# Patient Record
Sex: MALE | Race: WHITE | Employment: OTHER | URBAN - NONMETROPOLITAN AREA
[De-identification: names, ages, dates, MRNs, and addresses within clinical notes are randomized per-mention and may not be internally consistent; named-entity substitution may affect disease eponyms.]

---

## 2022-02-02 ENCOUNTER — OFFICE VISIT (OUTPATIENT)
Dept: PRIMARY CARE CLINIC | Age: 62
End: 2022-02-02
Payer: COMMERCIAL

## 2022-02-02 VITALS
BODY MASS INDEX: 37.06 KG/M2 | HEIGHT: 66 IN | OXYGEN SATURATION: 99 % | WEIGHT: 230.6 LBS | DIASTOLIC BLOOD PRESSURE: 74 MMHG | HEART RATE: 97 BPM | TEMPERATURE: 97.3 F | SYSTOLIC BLOOD PRESSURE: 134 MMHG

## 2022-02-02 DIAGNOSIS — K61.0 ANAL CELLULITIS: Primary | ICD-10-CM

## 2022-02-02 PROCEDURE — G8484 FLU IMMUNIZE NO ADMIN: HCPCS | Performed by: FAMILY MEDICINE

## 2022-02-02 PROCEDURE — 99212 OFFICE O/P EST SF 10 MIN: CPT | Performed by: FAMILY MEDICINE

## 2022-02-02 PROCEDURE — 3017F COLORECTAL CA SCREEN DOC REV: CPT | Performed by: FAMILY MEDICINE

## 2022-02-02 PROCEDURE — 1036F TOBACCO NON-USER: CPT | Performed by: FAMILY MEDICINE

## 2022-02-02 PROCEDURE — G8427 DOCREV CUR MEDS BY ELIG CLIN: HCPCS | Performed by: FAMILY MEDICINE

## 2022-02-02 PROCEDURE — G8417 CALC BMI ABV UP PARAM F/U: HCPCS | Performed by: FAMILY MEDICINE

## 2022-02-02 PROCEDURE — 99213 OFFICE O/P EST LOW 20 MIN: CPT | Performed by: FAMILY MEDICINE

## 2022-02-02 RX ORDER — LYSINE 500 MG
500 TABLET ORAL DAILY
COMMUNITY

## 2022-02-02 RX ORDER — NAPROXEN 500 MG/1
1 TABLET ORAL DAILY
COMMUNITY
Start: 2021-09-02

## 2022-02-02 RX ORDER — FLUTICASONE PROPIONATE 50 MCG
2 SPRAY, SUSPENSION (ML) NASAL 2 TIMES DAILY
COMMUNITY
Start: 2021-02-10

## 2022-02-02 RX ORDER — SULFAMETHOXAZOLE AND TRIMETHOPRIM 800; 160 MG/1; MG/1
1 TABLET ORAL 2 TIMES DAILY
Qty: 20 TABLET | Refills: 0 | Status: SHIPPED | OUTPATIENT
Start: 2022-02-02 | End: 2022-02-12

## 2022-02-02 RX ORDER — LORATADINE 10 MG/1
10 TABLET ORAL
COMMUNITY

## 2022-02-02 RX ORDER — ZINC SULFATE 50(220)MG
1 CAPSULE ORAL DAILY
COMMUNITY

## 2022-02-02 RX ORDER — ASPIRIN 81 MG/1
81 TABLET ORAL DAILY
COMMUNITY

## 2022-02-02 RX ORDER — EZETIMIBE 10 MG/1
10 TABLET ORAL DAILY
COMMUNITY
Start: 2021-06-16

## 2022-02-02 RX ORDER — LISINOPRIL AND HYDROCHLOROTHIAZIDE 12.5; 1 MG/1; MG/1
1 TABLET ORAL DAILY
COMMUNITY
Start: 2021-05-05

## 2022-02-02 ASSESSMENT — PATIENT HEALTH QUESTIONNAIRE - PHQ9
SUM OF ALL RESPONSES TO PHQ QUESTIONS 1-9: 0
SUM OF ALL RESPONSES TO PHQ9 QUESTIONS 1 & 2: 0
2. FEELING DOWN, DEPRESSED OR HOPELESS: 0
SUM OF ALL RESPONSES TO PHQ QUESTIONS 1-9: 0
1. LITTLE INTEREST OR PLEASURE IN DOING THINGS: 0

## 2022-02-02 ASSESSMENT — ENCOUNTER SYMPTOMS
ANAL BLEEDING: 0
CONSTIPATION: 0
GASTROINTESTINAL NEGATIVE: 1
RESPIRATORY NEGATIVE: 1
ALLERGIC/IMMUNOLOGIC NEGATIVE: 1
EYES NEGATIVE: 1
BLOOD IN STOOL: 0

## 2022-02-02 NOTE — PROGRESS NOTES
Subjective:      Patient ID: Deedee Rdz is a 62 y.o. male. HPI  Acute urgent care visit for concerns for recurrent cellulitis around the rectal area. Recurrent issues in the past, mention of possible fissure as a source but not sure. No current fever. History reviewed. No pertinent past medical history. History reviewed. No pertinent surgical history. Current Outpatient Medications   Medication Sig Dispense Refill    lisinopril-hydroCHLOROthiazide (PRINZIDE;ZESTORETIC) 10-12.5 MG per tablet Take 1 tablet by mouth daily      ezetimibe (ZETIA) 10 MG tablet Take 10 mg by mouth daily      fluticasone (FLONASE) 50 MCG/ACT nasal spray 2 sprays by Nasal route 2 times daily      naproxen (NAPROSYN) 500 MG tablet Take 1 tablet by mouth daily      loratadine (CLARITIN) 10 MG tablet Take 10 mg by mouth      aspirin 81 MG EC tablet Take 81 mg by mouth daily      zinc sulfate (ZINCATE) 220 (50 Zn) MG capsule Take 1 capsule by mouth daily      vitamin D (CHOLECALCIFEROL) 125 MCG (5000 UT) CAPS capsule Take 5,000 Units by mouth daily      ascorbic acid (VITAMIN C) 1000 MG tablet Take 1,000 mg by mouth daily      L-Lysine 500 MG TABS Take 500 mg by mouth daily       No current facility-administered medications for this visit. Allergies   Allergen Reactions    Amoxicillin      Intolerance     Hornet Venom Swelling     Other reaction(s): Unknown Reaction         Review of Systems   Constitutional: Negative. Negative for fever. HENT: Negative. Eyes: Negative. Respiratory: Negative. Cardiovascular: Negative. Gastrointestinal: Negative. Negative for anal bleeding, blood in stool and constipation. Endocrine: Negative. Genitourinary: Negative. Musculoskeletal: Negative. Skin: Positive for rash and wound (swollen area). Allergic/Immunologic: Negative. Neurological: Negative. Hematological: Negative. Psychiatric/Behavioral: Negative.         Objective:   Physical Exam  Constitutional:       Appearance: He is well-developed. HENT:      Head: Normocephalic and atraumatic. Eyes:      Pupils: Pupils are equal, round, and reactive to light. Cardiovascular:      Rate and Rhythm: Normal rate and regular rhythm. Heart sounds: Normal heart sounds. No murmur heard. Pulmonary:      Effort: Pulmonary effort is normal. No respiratory distress. Breath sounds: Normal breath sounds. No wheezing or rales. Abdominal:      General: There is no distension. Palpations: Abdomen is soft. There is no mass. Tenderness: There is no abdominal tenderness. Genitourinary:         Comments: Dermal ridge with some induration underneath. Venous structure visible to the side. No obvious thrombosed hemorrhoid, fissure, or drainage at present. Musculoskeletal:         General: No tenderness. Normal range of motion. Cervical back: Normal range of motion and neck supple. Skin:     General: Skin is warm. Findings: No erythema or rash. Neurological:      Mental Status: He is alert. Psychiatric:         Behavior: Behavior normal.         Thought Content: Thought content normal.         Judgment: Judgment normal.       /74   Pulse 97   Temp 97.3 °F (36.3 °C)   Ht 5' 6\" (1.676 m)   Wt 230 lb 9.6 oz (104.6 kg)   SpO2 99%   BMI 37.22 kg/m²     Assessment:      [place] anal cellulitis       Plan:      Bactrim ds bid x 10 days. Recheck prn concerns for persistent or progressing symptoms.       Fatimah Unger MD

## 2024-09-13 ENCOUNTER — OFFICE VISIT (OUTPATIENT)
Dept: PRIMARY CARE CLINIC | Age: 64
End: 2024-09-13
Payer: COMMERCIAL

## 2024-09-13 VITALS
OXYGEN SATURATION: 98 % | HEART RATE: 70 BPM | WEIGHT: 226 LBS | DIASTOLIC BLOOD PRESSURE: 74 MMHG | TEMPERATURE: 98.3 F | BODY MASS INDEX: 36.48 KG/M2 | SYSTOLIC BLOOD PRESSURE: 130 MMHG

## 2024-09-13 DIAGNOSIS — G89.29 CHRONIC LEFT-SIDED LOW BACK PAIN WITH LEFT-SIDED SCIATICA: Primary | ICD-10-CM

## 2024-09-13 DIAGNOSIS — M54.42 CHRONIC LEFT-SIDED LOW BACK PAIN WITH LEFT-SIDED SCIATICA: Primary | ICD-10-CM

## 2024-09-13 PROCEDURE — 99212 OFFICE O/P EST SF 10 MIN: CPT

## 2024-09-13 RX ORDER — KETOROLAC TROMETHAMINE 30 MG/ML
30 INJECTION, SOLUTION INTRAMUSCULAR; INTRAVENOUS ONCE
Status: COMPLETED | OUTPATIENT
Start: 2024-09-13 | End: 2024-09-13

## 2024-09-13 RX ORDER — LIDOCAINE 4 G/G
1 PATCH TOPICAL DAILY
Qty: 14 EACH | Refills: 0 | Status: SHIPPED | OUTPATIENT
Start: 2024-09-13 | End: 2024-09-27

## 2024-09-13 RX ORDER — PREDNISONE 20 MG/1
40 TABLET ORAL DAILY
Qty: 10 TABLET | Refills: 0 | Status: SHIPPED | OUTPATIENT
Start: 2024-09-13 | End: 2024-09-18

## 2024-09-13 RX ADMIN — KETOROLAC TROMETHAMINE 30 MG: 30 INJECTION, SOLUTION INTRAMUSCULAR; INTRAVENOUS at 09:35

## 2024-09-13 ASSESSMENT — PATIENT HEALTH QUESTIONNAIRE - PHQ9
2. FEELING DOWN, DEPRESSED OR HOPELESS: NOT AT ALL
SUM OF ALL RESPONSES TO PHQ QUESTIONS 1-9: 0
SUM OF ALL RESPONSES TO PHQ QUESTIONS 1-9: 0
SUM OF ALL RESPONSES TO PHQ9 QUESTIONS 1 & 2: 0
1. LITTLE INTEREST OR PLEASURE IN DOING THINGS: NOT AT ALL
SUM OF ALL RESPONSES TO PHQ QUESTIONS 1-9: 0
SUM OF ALL RESPONSES TO PHQ QUESTIONS 1-9: 0

## 2024-09-13 ASSESSMENT — ENCOUNTER SYMPTOMS
BOWEL INCONTINENCE: 0
BACK PAIN: 1